# Patient Record
Sex: MALE | Race: WHITE | NOT HISPANIC OR LATINO | URBAN - METROPOLITAN AREA
[De-identification: names, ages, dates, MRNs, and addresses within clinical notes are randomized per-mention and may not be internally consistent; named-entity substitution may affect disease eponyms.]

---

## 2018-10-24 ENCOUNTER — EMERGENCY (EMERGENCY)
Facility: HOSPITAL | Age: 58
LOS: 0 days | Discharge: LEFT BEFORE DISCHARGE | End: 2018-10-24
Attending: EMERGENCY MEDICINE | Admitting: EMERGENCY MEDICINE

## 2018-10-24 VITALS
SYSTOLIC BLOOD PRESSURE: 164 MMHG | HEART RATE: 110 BPM | RESPIRATION RATE: 20 BRPM | OXYGEN SATURATION: 96 % | DIASTOLIC BLOOD PRESSURE: 76 MMHG | TEMPERATURE: 96 F

## 2018-10-24 DIAGNOSIS — R42 DIZZINESS AND GIDDINESS: ICD-10-CM

## 2018-10-24 DIAGNOSIS — Z77.018 CONTACT WITH AND (SUSPECTED) EXPOSURE TO OTHER HAZARDOUS METALS: ICD-10-CM

## 2018-10-24 DIAGNOSIS — I10 ESSENTIAL (PRIMARY) HYPERTENSION: ICD-10-CM

## 2018-10-24 DIAGNOSIS — R07.89 OTHER CHEST PAIN: ICD-10-CM

## 2018-10-24 DIAGNOSIS — R07.2 PRECORDIAL PAIN: ICD-10-CM

## 2018-10-24 LAB
ALBUMIN SERPL ELPH-MCNC: 4.9 G/DL — SIGNIFICANT CHANGE UP (ref 3.5–5.2)
ALP SERPL-CCNC: 69 U/L — SIGNIFICANT CHANGE UP (ref 30–115)
ALT FLD-CCNC: 27 U/L — SIGNIFICANT CHANGE UP (ref 0–41)
ANION GAP SERPL CALC-SCNC: 19 MMOL/L — HIGH (ref 7–14)
APTT BLD: 33.6 SEC — SIGNIFICANT CHANGE UP (ref 27–39.2)
AST SERPL-CCNC: 24 U/L — SIGNIFICANT CHANGE UP (ref 0–41)
BASE EXCESS BLDA CALC-SCNC: -0.8 MMOL/L — SIGNIFICANT CHANGE UP (ref -2–2)
BASE EXCESS BLDA CALC-SCNC: 0.1 MMOL/L — SIGNIFICANT CHANGE UP (ref -2–2)
BASE EXCESS BLDV CALC-SCNC: 1.1 MMOL/L — SIGNIFICANT CHANGE UP (ref -2–2)
BASOPHILS # BLD AUTO: 0.07 K/UL — SIGNIFICANT CHANGE UP (ref 0–0.2)
BASOPHILS NFR BLD AUTO: 0.5 % — SIGNIFICANT CHANGE UP (ref 0–1)
BILIRUB SERPL-MCNC: 0.6 MG/DL — SIGNIFICANT CHANGE UP (ref 0.2–1.2)
BUN SERPL-MCNC: 18 MG/DL — SIGNIFICANT CHANGE UP (ref 10–20)
CA-I SERPL-SCNC: 1.25 MMOL/L — SIGNIFICANT CHANGE UP (ref 1.12–1.3)
CALCIUM SERPL-MCNC: 10.1 MG/DL — SIGNIFICANT CHANGE UP (ref 8.5–10.1)
CHLORIDE SERPL-SCNC: 99 MMOL/L — SIGNIFICANT CHANGE UP (ref 98–110)
CO2 SERPL-SCNC: 22 MMOL/L — SIGNIFICANT CHANGE UP (ref 17–32)
COHGB MFR BLDA: 22.5 % — CRITICAL HIGH (ref 0–3)
CREAT SERPL-MCNC: 1.2 MG/DL — SIGNIFICANT CHANGE UP (ref 0.7–1.5)
EOSINOPHIL # BLD AUTO: 0.1 K/UL — SIGNIFICANT CHANGE UP (ref 0–0.7)
EOSINOPHIL NFR BLD AUTO: 0.8 % — SIGNIFICANT CHANGE UP (ref 0–8)
GAS PNL BLDV: 141 MMOL/L — SIGNIFICANT CHANGE UP (ref 136–145)
GAS PNL BLDV: SIGNIFICANT CHANGE UP
GLUCOSE SERPL-MCNC: 99 MG/DL — SIGNIFICANT CHANGE UP (ref 70–99)
HCO3 BLDA-SCNC: 25 MMOL/L — SIGNIFICANT CHANGE UP (ref 23–27)
HCO3 BLDA-SCNC: 26 MMOL/L — SIGNIFICANT CHANGE UP (ref 23–27)
HCO3 BLDV-SCNC: 28 MMOL/L — SIGNIFICANT CHANGE UP (ref 22–29)
HCT VFR BLD CALC: 46.7 % — SIGNIFICANT CHANGE UP (ref 42–52)
HCT VFR BLDA CALC: 49.4 % — HIGH (ref 34–44)
HGB BLD CALC-MCNC: 16.1 G/DL — SIGNIFICANT CHANGE UP (ref 14–18)
HGB BLD-MCNC: 15.8 G/DL — SIGNIFICANT CHANGE UP (ref 14–18)
HGB BLDA-MCNC: 14.8 G/DL — SIGNIFICANT CHANGE UP (ref 14–18)
IMM GRANULOCYTES NFR BLD AUTO: 0.5 % — HIGH (ref 0.1–0.3)
INR BLD: 0.88 RATIO — SIGNIFICANT CHANGE UP (ref 0.65–1.3)
LACTATE BLDV-MCNC: 2.7 MMOL/L — HIGH (ref 0.5–1.6)
LYMPHOCYTES # BLD AUTO: 19.2 % — LOW (ref 20.5–51.1)
LYMPHOCYTES # BLD AUTO: 2.49 K/UL — SIGNIFICANT CHANGE UP (ref 1.2–3.4)
MCHC RBC-ENTMCNC: 30.1 PG — SIGNIFICANT CHANGE UP (ref 27–31)
MCHC RBC-ENTMCNC: 33.8 G/DL — SIGNIFICANT CHANGE UP (ref 32–37)
MCV RBC AUTO: 89 FL — SIGNIFICANT CHANGE UP (ref 80–94)
METHGB MFR BLDA: 0.9 % — SIGNIFICANT CHANGE UP (ref 0.5–1.5)
MONOCYTES # BLD AUTO: 1.51 K/UL — HIGH (ref 0.1–0.6)
MONOCYTES NFR BLD AUTO: 11.6 % — HIGH (ref 1.7–9.3)
NEUTROPHILS # BLD AUTO: 8.76 K/UL — HIGH (ref 1.4–6.5)
NEUTROPHILS NFR BLD AUTO: 67.4 % — SIGNIFICANT CHANGE UP (ref 42.2–75.2)
NRBC # BLD: 0 /100 WBCS — SIGNIFICANT CHANGE UP (ref 0–0)
NT-PROBNP SERPL-SCNC: 5 PG/ML — SIGNIFICANT CHANGE UP (ref 0–300)
O2 CT VFR BLDA CALC: 16 VOL % — SIGNIFICANT CHANGE UP (ref 15–23)
OXYHGB MFR BLDA: 76 % — LOW (ref 94–100)
PCO2 BLDA: 42 MMHG — SIGNIFICANT CHANGE UP (ref 38–42)
PCO2 BLDA: 45 MMHG — HIGH (ref 38–42)
PCO2 BLDV: 49 MMHG — SIGNIFICANT CHANGE UP (ref 41–51)
PH BLDA: 7.37 — LOW (ref 7.38–7.42)
PH BLDA: 7.38 — SIGNIFICANT CHANGE UP (ref 7.38–7.42)
PH BLDV: 7.36 — SIGNIFICANT CHANGE UP (ref 7.26–7.43)
PLATELET # BLD AUTO: 296 K/UL — SIGNIFICANT CHANGE UP (ref 130–400)
PO2 BLDA: 110 MMHG — HIGH (ref 78–95)
PO2 BLDA: 366 MMHG — HIGH (ref 78–95)
PO2 BLDV: 14 MMHG — LOW (ref 20–40)
POTASSIUM BLDV-SCNC: 4.1 MMOL/L — SIGNIFICANT CHANGE UP (ref 3.3–5.6)
POTASSIUM SERPL-MCNC: 4.8 MMOL/L — SIGNIFICANT CHANGE UP (ref 3.5–5)
POTASSIUM SERPL-SCNC: 4.8 MMOL/L — SIGNIFICANT CHANGE UP (ref 3.5–5)
PROT SERPL-MCNC: 7.6 G/DL — SIGNIFICANT CHANGE UP (ref 6–8)
PROTHROM AB SERPL-ACNC: 10.1 SEC — SIGNIFICANT CHANGE UP (ref 9.95–12.87)
RBC # BLD: 5.25 M/UL — SIGNIFICANT CHANGE UP (ref 4.7–6.1)
RBC # FLD: 13 % — SIGNIFICANT CHANGE UP (ref 11.5–14.5)
SAO2 % BLDA: 100 % — HIGH (ref 94–98)
SAO2 % BLDA: 99 % — HIGH (ref 94–98)
SAO2 % BLDV: 47 % — SIGNIFICANT CHANGE UP
SODIUM SERPL-SCNC: 140 MMOL/L — SIGNIFICANT CHANGE UP (ref 135–146)
TROPONIN T SERPL-MCNC: <0.01 NG/ML — SIGNIFICANT CHANGE UP
WBC # BLD: 12.99 K/UL — HIGH (ref 4.8–10.8)
WBC # FLD AUTO: 12.99 K/UL — HIGH (ref 4.8–10.8)

## 2018-10-24 NOTE — ED PROVIDER NOTE - MEDICAL DECISION MAKING DETAILS
I personally evaluated the patient. I reviewed the Resident’s or Physician Assistant’s note (as assigned above), and agree with the findings and plan except as documented in my note. The patient wishes to leave against medical advice.  I have discussed the risks, benefits and alternatives (including the possibility of worsening of disease, pain, permanent disability, and/or death) with the patient and his/her family (if available).  The patient voices understanding of these risks, benefits, and alternatives and still wishes to sign out against medical advice.  The patient is awake, alert, oriented x 3 and has demonstrated capacity to refuse/direct care.  I have advised the patient that they can and should return immediately should they develop any worse/different/additional symptoms, or if they change their mind and want to continue their care. Patient has full capacity and has verbalized understanding.  Given detailed returned precautions. Patient neurological exam prior to discharge unremarkable. Patient has full capacity. 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 Patient has full capacity.

## 2018-10-24 NOTE — ED PROVIDER NOTE - PROGRESS NOTE DETAILS
CO level 22.5. Patient symptomatic (signs of end organ damage) with chest pain, EKG changes. May require transfer for hyperbaric therapy. Will speak with tox. Spoke with Dr. Gonzalez who informed me EMS found possible carbon monoxide alarms going off in patient's house (this was not endorsed to us during initial patient arrival). Will check CO level Case endorsed to Dr. Harper pending tox consult and recs, dispo. Pt informed of all lab and radiology results, demonstrates understanding of results.  Pt wants to be discharged despite being advised STRONGLY and REPEATEDLY to stay for admission.  A&Ox3, speaking clearly and coherently, demonstrates decision-making capacity.  Pt understands that the risks of leaving include, but not limited to, death and/or disability; pt accepts these risks.  Pt understands to be seen by PMD in 1-2 days; pt understands to return as soon as possible for persistence or any worsening of symptoms. Pt given opportunity to ask any and all questions.  Pt understands that the ER diagnosis being given today is a preliminary one, and, like many ER diagnoses, is a preliminary impression, often based on limited information, that may change as current Sx's evolve or new Sx's develop.

## 2018-10-24 NOTE — ED ADULT NURSE REASSESSMENT NOTE - NS ED NURSE REASSESS COMMENT FT1
pt is requesting to leave AMA. pt has discussed all risks to leaving AMA, pt was told by physician Yousseff there is a chance of death, pt responded "that is a chance I have to take". pt pending dcd and ama forms.

## 2018-10-24 NOTE — ED PROVIDER NOTE - ATTENDING CONTRIBUTION TO CARE
58 year old male, pmhx HTN, HLD, presenting from home with sudden onset tight-substernal chest pain radiating to his back that began suddenly 1 hour prior to arrival, associated with generalized body numbness and weakness. Denies having this pain before. Denies fevers, headache, vision changes, weakness/numbness, confusion, URI symptoms, neck pain, dyspnea, cough, palpitations, nausea, vomiting, abdominal pain, diarrhea, constipation, blood in stool/dark stools, urinary symptoms, penile discharge/testicular pain, leg swelling, rash, recent travel or sick contacts.    Vital Signs: I have reviewed the initial vital signs.  Constitutional: NAD, well-nourished, appears stated age, no acute distress.  HEENT: Airway patent, moist MM, no erythema/swelling/deformity of oral structures. EOMI, PERRLA.  CV: regular rate, regular rhythm, well-perfused extremities, 2+ b/l DP and radial pulses equal.  Lungs: BCTA, no increased WOB.  ABD: NTND, no guarding or rebound, no pulsatile mass, no hernias.   MSK: Neck supple, nontender, nl ROM, no stepoff. Chest nontender. Back nontender in TLS spine or to b/l bony structures or flanks. Ext nontender, nl rom, no deformity.   INTEG: Skin warm, dry, no rash.  NEURO: A&Ox3, CN II-XII intact, normal strength 5/5 all 4 ext, nl sensation throughout, normal speech and coordination.  PSYCH: Calm, cooperative, normal affect and interaction.    Will CTA to rule out dissection, EKG shows non-specific changes, no STEMI, ACS work up, re-eval. Likely admit for EKG changes.

## 2018-10-24 NOTE — ED PROVIDER NOTE - NS ED ROS FT
ENMT:   No neck pain or stiffness.  Cardiac:  + chest pain, No SOB or edema. No chest pain with exertion.  Respiratory:  No cough or respiratory distress. No hemoptysis. No history of asthma or RAD.  GI:  No nausea, vomiting, diarrhea or abdominal pain.  MS:  No myalgia, muscle weakness, joint pain or back pain.  Neuro:  No headache or weakness.  No LOC.  Skin:  No skin rash.   Endocrine: No history of diabetes.  Except as documented in the HPI,  all other systems are negative.